# Patient Record
Sex: MALE | Race: WHITE | Employment: OTHER | URBAN - METROPOLITAN AREA
[De-identification: names, ages, dates, MRNs, and addresses within clinical notes are randomized per-mention and may not be internally consistent; named-entity substitution may affect disease eponyms.]

---

## 2017-10-05 ENCOUNTER — HOSPITAL ENCOUNTER (EMERGENCY)
Facility: HOSPITAL | Age: 42
Discharge: HOME | End: 2017-10-05
Attending: EMERGENCY MEDICINE
Payer: MEDICARE

## 2017-10-05 ENCOUNTER — APPOINTMENT (OUTPATIENT)
Dept: GENERAL RADIOLOGY | Facility: HOSPITAL | Age: 42
End: 2017-10-05
Attending: EMERGENCY MEDICINE
Payer: MEDICARE

## 2017-10-05 VITALS
OXYGEN SATURATION: 99 % | SYSTOLIC BLOOD PRESSURE: 133 MMHG | HEART RATE: 98 BPM | TEMPERATURE: 98 F | RESPIRATION RATE: 16 BRPM | WEIGHT: 183.63 LBS | BODY MASS INDEX: 27.83 KG/M2 | DIASTOLIC BLOOD PRESSURE: 96 MMHG | HEIGHT: 68 IN

## 2017-10-05 DIAGNOSIS — M70.52 PATELLAR BURSITIS OF LEFT KNEE: Primary | ICD-10-CM

## 2017-10-05 PROCEDURE — 73560 X-RAY EXAM OF KNEE 1 OR 2: CPT | Performed by: EMERGENCY MEDICINE

## 2017-10-05 PROCEDURE — 99283 EMERGENCY DEPT VISIT LOW MDM: CPT

## 2017-10-05 NOTE — ED INITIAL ASSESSMENT (HPI)
Pt came in for bilat knee swelling and pain since this morning. Lives in group home, nonverbal. Staff reports increased agitation. RR even and nonlabored, CMS intact, ambulatory with steady gait.

## 2017-10-06 NOTE — ED PROVIDER NOTES
Patient Seen in: [facility] AND [facility] Emergency Department    History   Patient presents with:  Swelling Edema (cardiovascular, metabolic)    Stated Complaint: bilateral knee swelling started today denies any injury     HPI    79-year-old male with history Normocephalic and atraumatic. Eyes: Conjunctivae are normal. Pupils are equal, round, and reactive to light. Neck: Normal range of motion. Neck supple. Cardiovascular: Normal rate, regular rhythm and intact distal pulses.     Pulmonary/Chest: Effort n fracture or destructive process. No definite joint effusion. 2. Findings are nonspecific but suggest possible peripatellar/prepatellar bursitis. Correlate clinically        MDM     The caretaker states that the patient is walking normally.   His knee swelli

## 2017-12-08 ENCOUNTER — OFFICE VISIT (OUTPATIENT)
Dept: NEUROLOGY | Facility: CLINIC | Age: 42
End: 2017-12-08

## 2017-12-08 ENCOUNTER — TELEPHONE (OUTPATIENT)
Dept: NEUROLOGY | Facility: CLINIC | Age: 42
End: 2017-12-08

## 2017-12-08 VITALS
BODY MASS INDEX: 31.76 KG/M2 | HEIGHT: 64 IN | RESPIRATION RATE: 16 BRPM | WEIGHT: 186 LBS | DIASTOLIC BLOOD PRESSURE: 68 MMHG | HEART RATE: 60 BPM | SYSTOLIC BLOOD PRESSURE: 130 MMHG

## 2017-12-08 DIAGNOSIS — H61.20 IMPACTED CERUMEN, UNSPECIFIED LATERALITY: ICD-10-CM

## 2017-12-08 DIAGNOSIS — R25.1 TREMOR: Primary | ICD-10-CM

## 2017-12-08 PROCEDURE — 99204 OFFICE O/P NEW MOD 45 MIN: CPT | Performed by: OTHER

## 2017-12-08 RX ORDER — AMANTADINE HYDROCHLORIDE 100 MG/1
100 TABLET ORAL 2 TIMES DAILY
Qty: 60 TABLET | Refills: 3 | Status: SHIPPED | OUTPATIENT
Start: 2018-01-08 | End: 2018-02-08

## 2017-12-08 NOTE — PROGRESS NOTES
Neurology Initial Visit     Referred By: Dr. Charles ref. provider found    Chief Complaint: Patient presents with:  Tremors: Patient presents with: Patient here for tremors for years, getting worse for the past few months.  Patient is accompanied by caregiver mouth., Disp: , Rfl:   •  Flunisolide 25 MCG/ACT (0.025%) Nasal Solution, 2 sprays by Each Nare route., Disp: , Rfl:   •  omeprazole 20 MG Oral Capsule Delayed Release, Take 40 mg by mouth every morning before breakfast., Disp: , Rfl:   •  sucralfate 1 GM/ verbal, following only very simple commands     Cranial Nerves:  II.-unable to assess most of the cranial nerves  VII. Face symmetric, no facial weakness  IX. Pallet elevates symmetrically. XI. Shoulder shrug is intact  XII.  Tongue is midline    Motor Exa understanding of information given. All questions were answered. All side effects of drugs were discussed. Return to clinic in: No Follow-up on file.     Heber Reid MD

## 2017-12-08 NOTE — TELEPHONE ENCOUNTER
Aleta Sims Pt's caregiver informed insurance was verified and CT brain/head wo is a covered benefit and does not require authorization. Can proceed with scheduling appt. Requested order to be faxed to her, will fax.  Faxed CT order to Marina Valles. [facility]

## 2017-12-11 ENCOUNTER — MED REC SCAN ONLY (OUTPATIENT)
Dept: NEUROLOGY | Facility: CLINIC | Age: 42
End: 2017-12-11

## 2017-12-21 ENCOUNTER — HOSPITAL ENCOUNTER (OUTPATIENT)
Dept: CT IMAGING | Facility: HOSPITAL | Age: 42
Discharge: HOME | End: 2017-12-21
Attending: Other
Payer: MEDICARE

## 2017-12-21 DIAGNOSIS — R25.1 TREMOR: ICD-10-CM

## 2017-12-21 PROCEDURE — 70450 CT HEAD/BRAIN W/O DYE: CPT | Performed by: OTHER

## 2017-12-22 ENCOUNTER — TELEPHONE (OUTPATIENT)
Dept: NEUROLOGY | Facility: CLINIC | Age: 42
End: 2017-12-22

## 2017-12-22 NOTE — TELEPHONE ENCOUNTER
Spoke with his clinical coordinator, informed of results. Verbalized understanding. Requesting a referral to an ENT, stated he does not have one.

## 2017-12-22 NOTE — TELEPHONE ENCOUNTER
Spoke with clinical coordinator, information for ENT given to her. Requested order be faxed to her. Faxed order to clinical coordinator to arrange for him to see an ENT.

## 2017-12-22 NOTE — TELEPHONE ENCOUNTER
----- Message from Jennifer Kinney MD sent at 12/22/2017  8:26 AM CST -----  Please let patient know that CT of the head did not show any acute abnormalities, there was some atrophy of the brain, quite possibly related to the his seizure disorder and

## 2018-01-08 ENCOUNTER — TELEPHONE (OUTPATIENT)
Dept: NEUROLOGY | Facility: CLINIC | Age: 43
End: 2018-01-08

## 2018-01-16 ENCOUNTER — OFFICE VISIT (OUTPATIENT)
Dept: OTOLARYNGOLOGY | Facility: CLINIC | Age: 43
End: 2018-01-16

## 2018-01-16 VITALS
WEIGHT: 194 LBS | SYSTOLIC BLOOD PRESSURE: 150 MMHG | TEMPERATURE: 97 F | BODY MASS INDEX: 32.32 KG/M2 | DIASTOLIC BLOOD PRESSURE: 100 MMHG | HEIGHT: 65 IN

## 2018-01-16 DIAGNOSIS — H61.22 IMPACTED CERUMEN OF LEFT EAR: Primary | ICD-10-CM

## 2018-01-16 PROCEDURE — G0463 HOSPITAL OUTPT CLINIC VISIT: HCPCS | Performed by: OTOLARYNGOLOGY

## 2018-01-16 PROCEDURE — 99214 OFFICE O/P EST MOD 30 MIN: CPT | Performed by: OTOLARYNGOLOGY

## 2018-01-16 RX ORDER — LORAZEPAM 1 MG/1
TABLET ORAL
Refills: 0 | COMMUNITY
Start: 2017-11-14 | End: 2020-04-10

## 2018-01-16 RX ORDER — METOCLOPRAMIDE 10 MG/1
10 TABLET ORAL 4 TIMES DAILY
COMMUNITY
End: 2020-04-10

## 2018-01-16 RX ORDER — RISPERIDONE 1 MG/1
1 TABLET, FILM COATED ORAL 2 TIMES DAILY
COMMUNITY
End: 2018-02-08

## 2018-01-16 RX ORDER — FAMOTIDINE 40 MG/1
40 TABLET, FILM COATED ORAL NIGHTLY
COMMUNITY

## 2018-01-16 NOTE — PROGRESS NOTES
Amanda De La Cruz is a 36year old male. Patient presents with:  Ear Wax: bilateral ear cleaning       HISTORY OF PRESENT ILLNESS  History of autism and lives in a group home. Lives in care home. Discussed ear cleaning.      9/1/16 He presents today for eval syndrome    • Bipolar 1 disorder (HCC)    • Bipolar 1 disorder (HCC)    • Cataract    • Cataracts, bilateral    • Depression    • Esophageal reflux    • Seizure ([address]    • Seizure disorder ([address]        History reviewed. No pertinent surgical history. by mouth 2 (two) times daily. , Disp: 60 tablet, Rfl: 3  •  carbamide peroxide (EAR DROPS EARWAX AID) 6.5 % Otic Solution, Place 5 drops into the left ear 3 (three) times daily. , Disp: 15 mL, Rfl: 0  •  ARIPiprazole 30 MG Oral Tab, Take 30 mg by mouth daily the tympanic membrane. I have no information suggesting that he has had some type of cholesteatoma surgery on that side. Hearing ability is unknown.   I once again recommended that if they wish to have the ear cleaned that he would need to be brought to t

## 2018-01-23 ENCOUNTER — TELEPHONE (OUTPATIENT)
Dept: OTOLARYNGOLOGY | Facility: CLINIC | Age: 43
End: 2018-01-23

## 2018-01-23 NOTE — TELEPHONE ENCOUNTER
Patients care giver states OSIRIS was supposed to fax over [phone] and notes over to her.  Requesting it to be faxed to [phone]

## 2018-02-08 ENCOUNTER — OFFICE VISIT (OUTPATIENT)
Dept: NEUROLOGY | Facility: CLINIC | Age: 43
End: 2018-02-08

## 2018-02-08 VITALS
WEIGHT: 186.81 LBS | HEART RATE: 123 BPM | DIASTOLIC BLOOD PRESSURE: 65 MMHG | BODY MASS INDEX: 31.13 KG/M2 | HEIGHT: 65 IN | SYSTOLIC BLOOD PRESSURE: 152 MMHG

## 2018-02-08 DIAGNOSIS — R25.1 TREMOR: Primary | ICD-10-CM

## 2018-02-08 PROCEDURE — 99214 OFFICE O/P EST MOD 30 MIN: CPT | Performed by: OTHER

## 2018-02-08 NOTE — PROGRESS NOTES
Neurology Initial Visit     Referred By: Dr. Charles ref. provider found    Chief Complaint: Patient presents with:  Tremors: LOV: 12/8/17. F/U on tremors.  Patient presents with caregivers who state that he has been doing about the same with no change in trem Rfl: 0  •  Metoclopramide HCl 10 MG Oral Tab, Take 10 mg by mouth 4 (four) times daily. , Disp: , Rfl:   •  famotidine 40 MG Oral Tab, Take 40 mg by mouth daily. , Disp: , Rfl:   •  carbamide peroxide (EAR DROPS EARWAX AID) 6.5 % Otic Solution, Place 5 drops Height: 65\"       General: No apparent distress, well nourished, well groomed.   Head- Normocephalic, atraumatic  Eyes- No redness or swelling  ENT- Hearing intake, smell preserved, normal glutition  Neck- No masses or adenopathy  Cv: pulses were palpabl seek medical attention immediately if symptoms worsen. Patient verbalized understanding of information given. All questions were answered. All side effects of drugs were discussed. Return to clinic in: Return in about 3 months (around 5/8/2018).

## 2020-04-08 ENCOUNTER — TELEPHONE (OUTPATIENT)
Dept: OTOLARYNGOLOGY | Facility: CLINIC | Age: 45
End: 2020-04-08

## 2020-04-08 DIAGNOSIS — H60.02 ABSCESS OF EXTERNAL EAR, LEFT: Primary | ICD-10-CM

## 2020-04-09 NOTE — TELEPHONE ENCOUNTER
Per  pt to be seen on Monday, pt scheduled per dr. Hernando Thomas, also per , pt parent who makes decision for pt will need at office visit as pt may require surgery.  Per Jossy Davies will have parent at visit, pt will need to care takers and will be g

## 2020-04-09 NOTE — TELEPHONE ENCOUNTER
Per Pam, pink cyst on his ear, feels squishy, currently diagnosed by  physician at [facility] center, pt diagnosed with cauliflower ear, has been treated with bactrim and getting worse, pt is autistic and would not tolerate in office procedure and feels pt

## 2020-04-10 RX ORDER — ERYTHROMYCIN 250 MG/1
250 TABLET, COATED ORAL
COMMUNITY

## 2020-04-10 RX ORDER — IBUPROFEN 600 MG/1
600 TABLET ORAL EVERY 6 HOURS PRN
COMMUNITY

## 2020-04-10 NOTE — TELEPHONE ENCOUNTER
Contacted Dr. Chica Means via telephone text with the plan to sedate Chavez Brantley prior to having him to present to [facility] on Monday for definitive incision and drainage of his ear lesion.   I did speak to Marin's mother this morning and we went over the risks ve

## 2020-04-10 NOTE — TELEPHONE ENCOUNTER
Per  spoke with Senait Wallace at [facility], pt will need Incision and drainage of left ear Abscess , 30 min under general anesthesia  to be done out patient.  Donny Cole will contact pt mother Marilee Winters [phone]

## 2020-04-13 ENCOUNTER — ANESTHESIA (OUTPATIENT)
Dept: SURGERY | Facility: HOSPITAL | Age: 45
End: 2020-04-13
Payer: MEDICARE

## 2020-04-13 ENCOUNTER — ANESTHESIA EVENT (OUTPATIENT)
Dept: SURGERY | Facility: HOSPITAL | Age: 45
End: 2020-04-13
Payer: MEDICARE

## 2020-04-13 ENCOUNTER — HOSPITAL ENCOUNTER (OUTPATIENT)
Facility: HOSPITAL | Age: 45
Setting detail: HOSPITAL OUTPATIENT SURGERY
Discharge: HOME | End: 2020-04-13
Attending: OTOLARYNGOLOGY | Admitting: OTOLARYNGOLOGY
Payer: MEDICARE

## 2020-04-13 VITALS
TEMPERATURE: 97 F | OXYGEN SATURATION: 92 % | SYSTOLIC BLOOD PRESSURE: 129 MMHG | BODY MASS INDEX: 32.1 KG/M2 | RESPIRATION RATE: 16 BRPM | HEIGHT: 64 IN | HEART RATE: 75 BPM | WEIGHT: 188 LBS | DIASTOLIC BLOOD PRESSURE: 87 MMHG

## 2020-04-13 DIAGNOSIS — H60.02 ABSCESS OF EXTERNAL EAR, LEFT: ICD-10-CM

## 2020-04-13 PROCEDURE — 0H93XZZ DRAINAGE OF LEFT EAR SKIN, EXTERNAL APPROACH: ICD-10-PCS | Performed by: OTOLARYNGOLOGY

## 2020-04-13 PROCEDURE — 69005 DRG XTRNL EAR ABSC/HEM COMP: CPT | Performed by: OTOLARYNGOLOGY

## 2020-04-13 RX ORDER — HALOPERIDOL 5 MG/ML
0.25 INJECTION INTRAMUSCULAR ONCE AS NEEDED
Status: DISCONTINUED | OUTPATIENT
Start: 2020-04-13 | End: 2020-04-13

## 2020-04-13 RX ORDER — ACETAMINOPHEN 325 MG/1
650 TABLET ORAL EVERY 4 HOURS PRN
Status: CANCELLED | OUTPATIENT
Start: 2020-04-13

## 2020-04-13 RX ORDER — HYDROCODONE BITARTRATE AND ACETAMINOPHEN 5; 325 MG/1; MG/1
2 TABLET ORAL EVERY 4 HOURS PRN
Status: CANCELLED | OUTPATIENT
Start: 2020-04-13

## 2020-04-13 RX ORDER — SODIUM CHLORIDE, SODIUM LACTATE, POTASSIUM CHLORIDE, CALCIUM CHLORIDE 600; 310; 30; 20 MG/100ML; MG/100ML; MG/100ML; MG/100ML
INJECTION, SOLUTION INTRAVENOUS CONTINUOUS
Status: CANCELLED | OUTPATIENT
Start: 2020-04-13

## 2020-04-13 RX ORDER — PROCHLORPERAZINE EDISYLATE 5 MG/ML
5 INJECTION INTRAMUSCULAR; INTRAVENOUS ONCE AS NEEDED
Status: DISCONTINUED | OUTPATIENT
Start: 2020-04-13 | End: 2020-04-13

## 2020-04-13 RX ORDER — LIDOCAINE HYDROCHLORIDE AND EPINEPHRINE 10; 10 MG/ML; UG/ML
INJECTION, SOLUTION INFILTRATION; PERINEURAL AS NEEDED
Status: DISCONTINUED | OUTPATIENT
Start: 2020-04-13 | End: 2020-04-13 | Stop reason: HOSPADM

## 2020-04-13 RX ORDER — HYDROCODONE BITARTRATE AND ACETAMINOPHEN 5; 325 MG/1; MG/1
1 TABLET ORAL AS NEEDED
Status: DISCONTINUED | OUTPATIENT
Start: 2020-04-13 | End: 2020-04-13

## 2020-04-13 RX ORDER — HYDROCODONE BITARTRATE AND ACETAMINOPHEN 5; 325 MG/1; MG/1
1 TABLET ORAL EVERY 4 HOURS PRN
Status: CANCELLED | OUTPATIENT
Start: 2020-04-13

## 2020-04-13 RX ORDER — HYDROMORPHONE HYDROCHLORIDE 1 MG/ML
0.2 INJECTION, SOLUTION INTRAMUSCULAR; INTRAVENOUS; SUBCUTANEOUS EVERY 5 MIN PRN
Status: DISCONTINUED | OUTPATIENT
Start: 2020-04-13 | End: 2020-04-13

## 2020-04-13 RX ORDER — ALPRAZOLAM 2 MG/1
2 TABLET ORAL 3 TIMES DAILY PRN
COMMUNITY

## 2020-04-13 RX ORDER — MORPHINE SULFATE 4 MG/ML
4 INJECTION, SOLUTION INTRAMUSCULAR; INTRAVENOUS EVERY 10 MIN PRN
Status: DISCONTINUED | OUTPATIENT
Start: 2020-04-13 | End: 2020-04-13

## 2020-04-13 RX ORDER — ONDANSETRON 2 MG/ML
INJECTION INTRAMUSCULAR; INTRAVENOUS AS NEEDED
Status: DISCONTINUED | OUTPATIENT
Start: 2020-04-13 | End: 2020-04-13 | Stop reason: SURG

## 2020-04-13 RX ORDER — MORPHINE SULFATE 4 MG/ML
2 INJECTION, SOLUTION INTRAMUSCULAR; INTRAVENOUS EVERY 10 MIN PRN
Status: DISCONTINUED | OUTPATIENT
Start: 2020-04-13 | End: 2020-04-13

## 2020-04-13 RX ORDER — ONDANSETRON 2 MG/ML
4 INJECTION INTRAMUSCULAR; INTRAVENOUS ONCE AS NEEDED
Status: DISCONTINUED | OUTPATIENT
Start: 2020-04-13 | End: 2020-04-13

## 2020-04-13 RX ORDER — MORPHINE SULFATE 10 MG/ML
6 INJECTION, SOLUTION INTRAMUSCULAR; INTRAVENOUS EVERY 10 MIN PRN
Status: DISCONTINUED | OUTPATIENT
Start: 2020-04-13 | End: 2020-04-13

## 2020-04-13 RX ORDER — ACETAMINOPHEN 500 MG
1000 TABLET ORAL ONCE
Status: DISCONTINUED | OUTPATIENT
Start: 2020-04-13 | End: 2020-04-13 | Stop reason: HOSPADM

## 2020-04-13 RX ORDER — SODIUM CHLORIDE, SODIUM LACTATE, POTASSIUM CHLORIDE, CALCIUM CHLORIDE 600; 310; 30; 20 MG/100ML; MG/100ML; MG/100ML; MG/100ML
INJECTION, SOLUTION INTRAVENOUS CONTINUOUS
Status: DISCONTINUED | OUTPATIENT
Start: 2020-04-13 | End: 2020-04-13

## 2020-04-13 RX ORDER — MORPHINE SULFATE 2 MG/ML
2 INJECTION, SOLUTION INTRAMUSCULAR; INTRAVENOUS EVERY 2 HOUR PRN
Status: CANCELLED | OUTPATIENT
Start: 2020-04-13

## 2020-04-13 RX ORDER — ROCURONIUM BROMIDE 10 MG/ML
INJECTION, SOLUTION INTRAVENOUS AS NEEDED
Status: DISCONTINUED | OUTPATIENT
Start: 2020-04-13 | End: 2020-04-13 | Stop reason: SURG

## 2020-04-13 RX ORDER — LORAZEPAM 1 MG/1
1 TABLET ORAL EVERY 6 HOURS PRN
COMMUNITY

## 2020-04-13 RX ORDER — HYDROMORPHONE HYDROCHLORIDE 1 MG/ML
0.6 INJECTION, SOLUTION INTRAMUSCULAR; INTRAVENOUS; SUBCUTANEOUS EVERY 5 MIN PRN
Status: DISCONTINUED | OUTPATIENT
Start: 2020-04-13 | End: 2020-04-13

## 2020-04-13 RX ORDER — DEXAMETHASONE SODIUM PHOSPHATE 4 MG/ML
VIAL (ML) INJECTION AS NEEDED
Status: DISCONTINUED | OUTPATIENT
Start: 2020-04-13 | End: 2020-04-13 | Stop reason: SURG

## 2020-04-13 RX ORDER — FAMOTIDINE 20 MG/1
20 TABLET ORAL ONCE
Status: DISCONTINUED | OUTPATIENT
Start: 2020-04-13 | End: 2020-04-13 | Stop reason: HOSPADM

## 2020-04-13 RX ORDER — SODIUM CHLORIDE 0.9 % (FLUSH) 0.9 %
10 SYRINGE (ML) INJECTION AS NEEDED
Status: CANCELLED | OUTPATIENT
Start: 2020-04-13

## 2020-04-13 RX ORDER — NEOSTIGMINE METHYLSULFATE 1 MG/ML
INJECTION INTRAVENOUS AS NEEDED
Status: DISCONTINUED | OUTPATIENT
Start: 2020-04-13 | End: 2020-04-13 | Stop reason: SURG

## 2020-04-13 RX ORDER — GLYCOPYRROLATE 0.2 MG/ML
INJECTION, SOLUTION INTRAMUSCULAR; INTRAVENOUS AS NEEDED
Status: DISCONTINUED | OUTPATIENT
Start: 2020-04-13 | End: 2020-04-13 | Stop reason: SURG

## 2020-04-13 RX ORDER — MORPHINE SULFATE 4 MG/ML
8 INJECTION, SOLUTION INTRAMUSCULAR; INTRAVENOUS EVERY 2 HOUR PRN
Status: CANCELLED | OUTPATIENT
Start: 2020-04-13

## 2020-04-13 RX ORDER — ONDANSETRON 2 MG/ML
4 INJECTION INTRAMUSCULAR; INTRAVENOUS EVERY 6 HOURS PRN
Status: CANCELLED | OUTPATIENT
Start: 2020-04-13

## 2020-04-13 RX ORDER — LIDOCAINE HYDROCHLORIDE 10 MG/ML
INJECTION, SOLUTION EPIDURAL; INFILTRATION; INTRACAUDAL; PERINEURAL AS NEEDED
Status: DISCONTINUED | OUTPATIENT
Start: 2020-04-13 | End: 2020-04-13 | Stop reason: SURG

## 2020-04-13 RX ORDER — MORPHINE SULFATE 4 MG/ML
4 INJECTION, SOLUTION INTRAMUSCULAR; INTRAVENOUS EVERY 2 HOUR PRN
Status: CANCELLED | OUTPATIENT
Start: 2020-04-13

## 2020-04-13 RX ORDER — HYDROMORPHONE HYDROCHLORIDE 1 MG/ML
0.4 INJECTION, SOLUTION INTRAMUSCULAR; INTRAVENOUS; SUBCUTANEOUS EVERY 5 MIN PRN
Status: DISCONTINUED | OUTPATIENT
Start: 2020-04-13 | End: 2020-04-13

## 2020-04-13 RX ORDER — METOCLOPRAMIDE 10 MG/1
10 TABLET ORAL ONCE
Status: DISCONTINUED | OUTPATIENT
Start: 2020-04-13 | End: 2020-04-13 | Stop reason: HOSPADM

## 2020-04-13 RX ORDER — HYDROCODONE BITARTRATE AND ACETAMINOPHEN 5; 325 MG/1; MG/1
2 TABLET ORAL AS NEEDED
Status: DISCONTINUED | OUTPATIENT
Start: 2020-04-13 | End: 2020-04-13

## 2020-04-13 RX ORDER — NALOXONE HYDROCHLORIDE 0.4 MG/ML
80 INJECTION, SOLUTION INTRAMUSCULAR; INTRAVENOUS; SUBCUTANEOUS AS NEEDED
Status: DISCONTINUED | OUTPATIENT
Start: 2020-04-13 | End: 2020-04-13

## 2020-04-13 RX ADMIN — ONDANSETRON 4 MG: 2 INJECTION INTRAMUSCULAR; INTRAVENOUS at 07:34:00

## 2020-04-13 RX ADMIN — NEOSTIGMINE METHYLSULFATE 2 MG: 1 INJECTION INTRAVENOUS at 07:48:00

## 2020-04-13 RX ADMIN — LIDOCAINE HYDROCHLORIDE 50 MG: 10 INJECTION, SOLUTION EPIDURAL; INFILTRATION; INTRACAUDAL; PERINEURAL at 07:29:00

## 2020-04-13 RX ADMIN — GLYCOPYRROLATE 0.2 MG: 0.2 INJECTION, SOLUTION INTRAMUSCULAR; INTRAVENOUS at 08:06:00

## 2020-04-13 RX ADMIN — SODIUM CHLORIDE, SODIUM LACTATE, POTASSIUM CHLORIDE, CALCIUM CHLORIDE: 600; 310; 30; 20 INJECTION, SOLUTION INTRAVENOUS at 07:23:00

## 2020-04-13 RX ADMIN — SODIUM CHLORIDE, SODIUM LACTATE, POTASSIUM CHLORIDE, CALCIUM CHLORIDE: 600; 310; 30; 20 INJECTION, SOLUTION INTRAVENOUS at 07:48:00

## 2020-04-13 RX ADMIN — ROCURONIUM BROMIDE 5 MG: 10 INJECTION, SOLUTION INTRAVENOUS at 07:29:00

## 2020-04-13 RX ADMIN — NEOSTIGMINE METHYLSULFATE 1 MG: 1 INJECTION INTRAVENOUS at 08:06:00

## 2020-04-13 RX ADMIN — GLYCOPYRROLATE 0.2 MG: 0.2 INJECTION, SOLUTION INTRAMUSCULAR; INTRAVENOUS at 07:27:00

## 2020-04-13 RX ADMIN — DEXAMETHASONE SODIUM PHOSPHATE 4 MG: 4 MG/ML VIAL (ML) INJECTION at 07:34:00

## 2020-04-13 RX ADMIN — GLYCOPYRROLATE 0.2 MG: 0.2 INJECTION, SOLUTION INTRAMUSCULAR; INTRAVENOUS at 07:48:00

## 2020-04-13 NOTE — H&P
Brian Suárez is a 38year old male. No chief complaint on file. HISTORY OF PRESENT ILLNESS  I have seen him in the past for unrelated issues.   He developed a swelling to his left ear was started on Bactrim DS and now presents for possible incisi changes. Respiratory Negative Dyspnea and wheezing. Cardio Negative Chest pain, irregular heartbeat/palpitations and syncope. GI Negative Abdominal pain and diarrhea. Endocrine Negative Cold intolerance and heat intolerance.    Neuro Negative Tremor wishes to proceed with incision and drainage of this abscess/hematoma.   She understands the risks associated with the surgery specifically postoperative pain, bleeding, the anesthesia use as well as recurrence and further infection requiring further surger

## 2020-04-13 NOTE — ANESTHESIA PREPROCEDURE EVALUATION
Anesthesia PreOp Note    HPI:     Luz Lazcano is a 38year old male who presents for preoperative consultation requested by: Jannie Sheridan MD    Date of Surgery: 4/13/2020    Procedure(s):   I AND D HEAD/FACE/NECK  Indication: Abscess of external ea 2000  divalproex Sodium 500 MG Oral Tab EC DR tab, Take 1,000 mg by mouth nightly.  , Disp: , Rfl: , 4/12/2020 at 2000  Melatonin 3 MG Oral Cap, Take by mouth., Disp: , Rfl: , 4/12/2020 at 2000  ibuprofen 600 MG Oral Tab, Take 600 mg by mouth every 6 (six) Relationships      Social connections:        Talks on phone: Not on file        Gets together: Not on file        Attends [employer] service: Not on file        Active member of club or organization: Not on file        Attends meetings of clubs or [place] valvular problems/murmurs, past MI, CAD, CABG/stent, dysrhythmias, angina, CHF    Rhythm: regular  Rate: normal    Neuro/Psych    (+)  seizures well controlled, bipolar disorder, depression, autistic    (-) TIA, CVA    Comments: Severe autism.  Nonverbal. P

## 2020-04-13 NOTE — OR NURSING
Patient still very drowsy, but responsive, is tolerating POs at time, per caregiver, this is patients \"normal\" when given sedation drugs. Per Dr Giorgi Martínez , okay to discharge.

## 2020-04-13 NOTE — ANESTHESIA POSTPROCEDURE EVALUATION
Patient: Brian Suárez    Procedure Summary     Date:  04/13/20 Room / Location:  [facility] OR 05 / [facility] OR    Anesthesia Start:  8883 Anesthesia Stop:      Procedure:   I AND D HEAD/FACE/NECK (Left ) Diagnosis:       Abscess of external ear, left

## 2020-04-13 NOTE — BRIEF OP NOTE
Pre-Operative Diagnosis: Abscess of external ear, left [H60.02]     Post-Operative Diagnosis: Hematoma of external ear, left [H60.02]      Procedure Performed:   Procedure(s):  incision and drainage of left ear hematoma    Surgeon(s) and Role:     Alyssa Mittal

## 2020-04-13 NOTE — ANESTHESIA PROCEDURE NOTES
Airway  Date/Time: 4/13/2020 7:30 AM  Urgency: Elective    Airway not difficult    General Information and Staff    Patient location during procedure: OR  Anesthesiologist: Jim Dias MD  Resident/CRNA: Adri Bahena CRNA  Performed: KRYSTAL Cai

## 2020-04-13 NOTE — OPERATIVE REPORT
[facility]    PATIENT'S NAME: Davi Wilson   ATTENDING PHYSICIAN: Lizandro Pollock. Deshawn Rubin MD   OPERATING PHYSICIAN: Lizandro Pollock.  Deshawn Rubin MD   PATIENT ACCOUNT#:   [id number]    LOCATION:  [facility] PACU 1 [facility] 10  MEDICAL RECORD #:   [id number]       DATE to prevent reformation of the hematoma. The complex closure was performed with placement of a through-and-through 4-0 chromic gut suture in a quilting fashion. The incision was then closed with the same suture.   Due to his underlying history of severe au

## 2020-04-13 NOTE — PACU NOTE
Patient appears comfortable, arousable, non-verbal, on room for 60 minutes and Spo2 is steady between ranges of 89-91%, no SOB noted, skin color is appropriate with ethtnicity, no bluish discoloration in head and peripheral extremities noted.   Anesthesia o

## 2020-04-14 ENCOUNTER — TELEPHONE (OUTPATIENT)
Dept: OTOLARYNGOLOGY | Facility: CLINIC | Age: 45
End: 2020-04-14

## 2020-04-14 NOTE — TELEPHONE ENCOUNTER
Pt is post op day 1 incision and drainage of abscess ear. Per Sharri Ward at [facility] MADDIE NATHAN development , incision dry and intact and advised per , that Dr. Lo Wright is to prescribe and oral antibiotic, bacitracin to be applied on front and back of sutures.  P

## 2020-04-14 NOTE — TELEPHONE ENCOUNTER
Per Nikolai Covarrubias, Pt had surgery 4/13/20 and is unsure when they need to do a follow up.  Please advise

## (undated) DEVICE — CASED DISP BIPOLAR CORD

## (undated) DEVICE — SOL  .9 1000ML BTL

## (undated) DEVICE — SUTURE CHROMIC GUT 4-0 SH

## (undated) DEVICE — SUTURE VICRYL 4-0 J494G

## (undated) DEVICE — HEAD & NECK: Brand: MEDLINE INDUSTRIES, INC.

## (undated) DEVICE — DRAIN PENROSE 12X1/4

## (undated) DEVICE — CAUTERY: TIP CLEANER XR 100/CS: Brand: MEDICAL ACTION INDUSTRIES

## (undated) DEVICE — BANDAID COVERLET 1X3

## (undated) DEVICE — SPONGE: SPECIALTY PEANUT XR 100/CS: Brand: MEDICAL ACTION INDUSTRIES

## (undated) DEVICE — CONMED ACCESSORY ELECTRODE, NEEDLE ELECTRODE

## (undated) DEVICE — SUTURE ETHILON 6-0 P-3

## (undated) DEVICE — NECK ACCESSORY: Brand: MEDLINE INDUSTRIES, INC.

## (undated) DEVICE — TRAY SKIN PREP PVP-1

## (undated) DEVICE — REM POLYHESIVE ADULT PATIENT RETURN ELECTRODE: Brand: VALLEYLAB

## (undated) DEVICE — ENCORE® LATEX ACCLAIM SIZE 8, STERILE LATEX POWDER-FREE SURGICAL GLOVE: Brand: ENCORE

## (undated) DEVICE — 1010 S-DRAPE TOWEL DRAPE 10/BX: Brand: STERI-DRAPE™

## (undated) DEVICE — ADHESIVE MASTISOL 2/3CC VL

## (undated) DEVICE — INTENDED FOR TISSUE SEPARATION, AND OTHER PROCEDURES THAT REQUIRE A SHARP SURGICAL BLADE TO PUNCTURE OR CUT.: Brand: BARD-PARKER ® STAINLESS STEEL BLADES

## (undated) DEVICE — GAUZE SPONGES: Brand: DEROYAL

## (undated) DEVICE — COVER SGL STRL LGHT HNDL BLU

## (undated) DEVICE — SUCTION CANISTER, 3000CC,SAFELINER: Brand: DEROYAL

## (undated) DEVICE — 3M™ STERI-STRIP™ REINFORCED ADHESIVE SKIN CLOSURES, R1547, 1/2 IN X 4 IN (12 MM X 100 MM), 6 STRIPS/ENVELOPE: Brand: 3M™ STERI-STRIP™

## (undated) NOTE — ED AVS SNAPSHOT
Angelito Mauro   MRN: [id number]    Department:  [facility] Emergency Department   Date of Visit:  10/5/2017           Disclosure     Insurance plans vary and the physician(s) referred by the ER may not be covered by your plan.  Please contact CARE PHYSICIAN AT ONCE OR RETURN IMMEDIATELY TO THE EMERGENCY DEPARTMENT. If you have been prescribed any medication(s), please fill your prescription right away and begin taking the medication(s) as directed.   If you believe that any of the medications